# Patient Record
Sex: FEMALE | Race: WHITE | NOT HISPANIC OR LATINO | Employment: FULL TIME | URBAN - METROPOLITAN AREA
[De-identification: names, ages, dates, MRNs, and addresses within clinical notes are randomized per-mention and may not be internally consistent; named-entity substitution may affect disease eponyms.]

---

## 2023-09-26 DIAGNOSIS — Z00.00 HEALTHCARE MAINTENANCE: Primary | ICD-10-CM

## 2024-03-20 ENCOUNTER — HOSPITAL ENCOUNTER (OUTPATIENT)
Dept: RADIOLOGY | Facility: HOSPITAL | Age: 54
Discharge: HOME | End: 2024-03-20
Payer: COMMERCIAL

## 2024-03-20 DIAGNOSIS — Z12.39 ENCOUNTER FOR OTHER SCREENING FOR MALIGNANT NEOPLASM OF BREAST: ICD-10-CM

## 2024-03-20 PROCEDURE — 6100000003 BI MR BREAST BILATERAL WITH CONTRAST FAST SCREENING SELF PAY

## 2024-03-20 RX ORDER — GADOTERATE MEGLUMINE 376.9 MG/ML
12 INJECTION INTRAVENOUS
Status: COMPLETED | OUTPATIENT
Start: 2024-03-20 | End: 2024-03-20

## 2024-03-20 RX ADMIN — GADOTERATE MEGLUMINE 12 ML: 376.9 INJECTION INTRAVENOUS at 09:34

## 2024-03-25 ENCOUNTER — TELEPHONE (OUTPATIENT)
Dept: SURGICAL ONCOLOGY | Facility: CLINIC | Age: 54
End: 2024-03-25
Payer: COMMERCIAL

## 2024-10-01 ENCOUNTER — APPOINTMENT (OUTPATIENT)
Dept: SURGICAL ONCOLOGY | Facility: CLINIC | Age: 54
End: 2024-10-01
Payer: COMMERCIAL

## 2024-10-01 ENCOUNTER — HOSPITAL ENCOUNTER (OUTPATIENT)
Dept: RADIOLOGY | Facility: CLINIC | Age: 54
Discharge: HOME | End: 2024-10-01
Payer: COMMERCIAL

## 2024-10-01 VITALS — HEIGHT: 63 IN | BODY MASS INDEX: 23.04 KG/M2 | WEIGHT: 130 LBS

## 2024-10-01 DIAGNOSIS — Z12.31 SCREENING MAMMOGRAM FOR BREAST CANCER: ICD-10-CM

## 2024-10-01 PROCEDURE — 77067 SCR MAMMO BI INCL CAD: CPT

## 2024-11-26 DIAGNOSIS — Z91.89 AT HIGH RISK FOR BREAST CANCER: ICD-10-CM

## 2025-03-19 ENCOUNTER — HOSPITAL ENCOUNTER (OUTPATIENT)
Dept: RADIOLOGY | Facility: HOSPITAL | Age: 55
Discharge: HOME | End: 2025-03-19

## 2025-03-19 VITALS — WEIGHT: 130 LBS | BODY MASS INDEX: 23.03 KG/M2

## 2025-03-19 DIAGNOSIS — Z91.89 AT HIGH RISK FOR BREAST CANCER: ICD-10-CM

## 2025-03-19 PROCEDURE — 2550000001 HC RX 255 CONTRASTS

## 2025-03-19 PROCEDURE — A9575 INJ GADOTERATE MEGLUMI 0.1ML: HCPCS

## 2025-03-19 PROCEDURE — 6100000003 BI MR BREAST BILATERAL WITH CONTRAST FAST SCREENING SELF PAY

## 2025-03-19 RX ORDER — GADOTERATE MEGLUMINE 376.9 MG/ML
12 INJECTION INTRAVENOUS
Status: COMPLETED | OUTPATIENT
Start: 2025-03-19 | End: 2025-03-19

## 2025-03-19 RX ORDER — GADOTERATE MEGLUMINE 376.9 MG/ML
12 INJECTION INTRAVENOUS
Status: CANCELLED | OUTPATIENT
Start: 2025-03-19

## 2025-03-19 RX ADMIN — GADOTERATE MEGLUMINE 12 ML: 376.9 INJECTION INTRAVENOUS at 09:16

## 2025-03-20 NOTE — PROGRESS NOTES
Chief Complaint  High risk breast cancer surveillance    History Of Present Illness  Julieth Junior is a very pleasant 55 y.o. female presenting for a high risk breast cancer surveillance.  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  Dr Ambar Silva performed right breast excision for biopsy noting fibroepithelial lesion and excision showed fibroadenoma with usual ductal hyperplasia, no atypia. She has history of additional benign breast biopsies.   She has family history of breast cancer in mother, maternal aunt, and maternal great aunt. Today, she denies new breast masses or nodules, skin or nipple changes, nipple discharge, or lymphadenopathy bilaterally. She declined tamoxifen.      BREAST IMAGING: 3/19/2025 FAST breast MRI BI-RADS Category 1.   10/1/2024 Bilateral screening mammogram, BI-RADS Category 2.       REPRODUCTIVE HISTORY: menarche age 11, , first birth age 33, no OCPs, suspected menopausal, amenorrhea due to uterine ablation, >1 breast biopsy, scattered  breast tissue           FAMILY CANCER HISTORY:   Mother: breast cancer age 52, uterine cancer in her 70s  Maternal aunt: breast cancer 78  maternal great aunt: breast cancer in her 50s  Father: primary hepatic cancer  Paternal grandfather: esophageal cancer     Review of Systems  Constitutional:  Negative for appetite change, fatigue, fever and unexpected weight change.   HENT:  Negative for ear pain, hearing loss, nosebleeds, sore throat and trouble swallowing.    Eyes:  Negative for discharge, itching and visual disturbance.   Breast: As stated in HPI.  Respiratory:  Negative for cough, chest tightness and shortness of breath.    Cardiovascular:  Negative for chest pain, palpitations and leg swelling.   Gastrointestinal:  Negative for abdominal pain, constipation, diarrhea and nausea.   Endocrine: Negative for cold intolerance and heat intolerance.   Genitourinary:  Negative for dysuria,  frequency, hematuria, pelvic pain and vaginal bleeding.   Musculoskeletal:  Negative for arthralgias, back pain, gait problem, joint swelling and myalgias.   Skin:  Negative for color change and rash.   Allergic/Immunologic: Negative for environmental allergies and food allergies.   Neurological:  Negative for dizziness, tremors, speech difficulty, weakness, numbness and headaches.   Hematological:  Does not bruise/bleed easily.   Psychiatric/Behavioral:  Negative for agitation, dysphoric mood and sleep disturbance. The patient is not nervous/anxious.       Surgical History  She has a past surgical history that includes Knee surgery (09/19/2016); Other surgical history (09/19/2016); Dilation and curettage of uterus (09/19/2016); Other surgical history (09/19/2016); Breast biopsy (10/20/2016); Neck surgery (03/06/2018); Breast biopsy (03/06/2018); and Other surgical history (09/13/2022).     Social History  She has no history on file for tobacco use, alcohol use, and drug use.    Family History  Family History   Problem Relation Name Age of Onset    Breast cancer Mother      Breast cancer Mother's Sister          Allergies  Patient has no known allergies.    Past Medical History  She has a past medical history of Benign neoplasm of right breast (10/20/2016), Neoplasm of uncertain behavior of unspecified breast (10/20/2016), Other abnormal and inconclusive findings on diagnostic imaging of breast (09/21/2016), Personal history of antineoplastic chemotherapy, Personal history of Hodgkin lymphoma (10/20/2016), Personal history of irradiation, Personal history of other specified conditions (09/21/2016), and Personal history of other specified conditions (08/04/2020).     Physical Exam  Patient is alert and oriented x3 and in a relaxed and appropriate mood. Her gait is steady and hand grasps are equal. Sclera is clear. The breasts are nearly symmetrical. Right breast far central lateral breast well healed excisional  biopsy scar. Left breast   far central lateral breast well healed excisional biopsy scar. The tissue is soft without palpable abnormalities, discrete nodules or masses. The skin and nipples appear normal. There is no cervical, supraclavicular or axillary lymphadenopathy. Heart rate and rhythm normal, S1 and S2 appreciated. The lungs are clear to auscultation bilaterally. Abdomen is soft and non-tender.      Physical Exam  Chest:            Last Recorded Vitals  Blood pressure 144/81, pulse 81, temperature 36.3 °C (97.3 °F), temperature source Temporal, weight 60.9 kg (134 lb 3.2 oz), SpO2 100%.    Relevant Results and Imaging  BI mammo bilateral screening tomosynthesis 10/01/2024    Narrative  Interpreted By:  Sahil Gatica,  STUDY:  BI MAMMO BILATERAL SCREENING TOMOSYNTHESIS;  10/1/2024 9:23 am    ACCESSION NUMBER(S):  [id number]    ORDERING CLINICIAN:  SELF MAMMOGRAM    INDICATION:  Screening. History of bilateral core needle and excisional biopsies.  History of Hodgkin's lymphoma treated with chemotherapy and  radiation. Family history of breast cancer    ,Z12.31 Encounter for screening mammogram for malignant neoplasm of  breast    COMPARISON:  Breast MRI 03/20/2024, screening mammogram 09/26/2023 and all  relevant prior breast imaging exams available at the time of  dictation.    FINDINGS:  2D and tomosynthesis images were reviewed at 1 mm slice thickness.    Density:  There are scattered areas of fibroglandular density.    There are bilateral biopsy markers and stable benign postsurgical  changes. No suspicious masses or calcifications are identified.    Impression  No mammographic evidence of malignancy.    BI-RADS CATEGORY:  BI-RADS Category:  2 Benign.  Recommendation:  Annual Screening.  Recommended Date:  1 Year.  Laterality:  Bilateral.        Provider Impressions  Normal clinical breast exam and imaging, history benign excisional breast biopsies, family history breast cancer.     Risk  Profile      Patient Discussion/Summary  Your clinical examination is normal. Please return in October 2025 for mammogram and office visit or sooner if you have any problems or concerns.     High risk breast surveillance care plan:  Yearly mammogram with digital breast tomosynthesis  Twice yearly clinical breast examinations  Breast MRI-due April 2026  Monthly self breast examinations &/or regular self breast awareness  Vitamin D3 2000 IU/daily (over the counter) unless your PCP recommends you take a specific dose  Exercise 3-4 times per week for 45-60 minutes  Limit alcohol to 3-4 drinks per week if you are menopausal  Eat healthy low-fat diet with lots of vegetable & fruits    Risk model indicates you are eligible for endocrine therapy with Tamoxifen, Raloxifene or Aromatase inhibitor. Endocrine therapy reduces lifetime risk of breast cancer by up to 50% when taken for 5 years. There is an alternative low dose Tamoxifen which can be taken for only 3 years.      IMPORTANT INFORMATION REGARDING YOUR RESULTS    If you receive medical information from My [facility] Personal Health Record (online chart) your results will be released into your chart. This means you may view or see results of your biopsy or procedure before I contact you directly. If this occurs, please call the office and we will discuss your results over the phone.     You can see your health information, review clinical summaries from office visits & test results online when you follow your health with MY  Chart, a personal health record. To sign up go to [email or web address]. If you need assistance with signing up or trouble getting into your account call [employer] Patient Line 24/7 at [phone].    My office phone number is [phone] if you need to get in touch with me or have additional questions or concerns. Thank you for choosing [facility] and trusting me as your healthcare provider. I look forward to seeing you again at your  next office visit. I am honored to be a provider on your health care team and I remain dedicated to helping you achieve your health goals.    Abby Denise, APRN-CNP

## 2025-03-21 ENCOUNTER — TELEPHONE (OUTPATIENT)
Dept: SURGICAL ONCOLOGY | Facility: HOSPITAL | Age: 55
End: 2025-03-21
Payer: COMMERCIAL

## 2025-03-21 NOTE — TELEPHONE ENCOUNTER
Result Communication    Resulted Orders   MR breast bilateral w IV contrast fast screening self pay    Narrative    Interpreted By:  Sandra Samuel,  and Tisha Glass   STUDY:  BI MR BREAST BILATERAL WITH CONTRAST FAST SCREENING SELF PAY;  3/19/2025 9:12 am      ACCESSION NUMBER(S):  [id number]      ORDERING CLINICIAN:  BARRY GLOVER      INDICATION:  Dense breast tissue on mammography. Patient has a family history of  breast cancer.      ,Z91.89 Other specified personal risk factors, not elsewhere  classified      COMPARISON:  MRI breast 03/20/2024. Mammogram 10/01/2024 and 09/26/2023.      TECHNIQUE:  Using a dedicated breast coil, STIR axial and T1-weighted fat  saturation axial images of the breasts were obtained, the latter both  before and after intravenous administration of Gadolinium DTPA.      Intravenous contrast: 12 ML of Dotarem      FINDINGS:  Density: Scattered fibroglandular tissue.      There is symmetric minimal bilateral background enhancement.      RIGHT BREAST:  No suspicious mass or nonmass enhancement is  identified.      No axillary or internal mammary lymphadenopathy is appreciated.      LEFT BREAST:  No suspicious mass or nonmass enhancement is identified.      No axillary or internal mammary lymphadenopathy is appreciated.      NON-BREAST FINDINGS:  None.        Impression    No MRI evidence of malignancy in either breast.      BI-RADS CATEGORY:      BI-RADS Category:  1 Negative.  Recommendation:  Annual Screening.  Recommended Date:  1 Year.  Laterality:  Bilateral.      For any future breast imaging appointments, please call [phone] ([phone]).          MACRO:  None      Signed by: Sandra Samuel 3/20/2025 3:32 PM  Dictation workstation:   [id number]       3:58 PM      Results were successfully communicated with the patient and they acknowledged their understanding.

## 2025-03-26 ENCOUNTER — OFFICE VISIT (OUTPATIENT)
Dept: SURGICAL ONCOLOGY | Facility: CLINIC | Age: 55
End: 2025-03-26
Payer: COMMERCIAL

## 2025-03-26 VITALS
OXYGEN SATURATION: 100 % | BODY MASS INDEX: 23.77 KG/M2 | TEMPERATURE: 97.3 F | SYSTOLIC BLOOD PRESSURE: 144 MMHG | DIASTOLIC BLOOD PRESSURE: 81 MMHG | WEIGHT: 134.2 LBS | HEART RATE: 81 BPM

## 2025-03-26 DIAGNOSIS — Z12.31 SCREENING MAMMOGRAM FOR BREAST CANCER: ICD-10-CM

## 2025-03-26 DIAGNOSIS — Z12.39 BREAST CANCER SCREENING, HIGH RISK PATIENT: Primary | ICD-10-CM

## 2025-03-26 DIAGNOSIS — Z80.3 FAMILY HISTORY OF BREAST CANCER: ICD-10-CM

## 2025-03-26 PROCEDURE — 99214 OFFICE O/P EST MOD 30 MIN: CPT

## 2025-03-26 ASSESSMENT — PAIN SCALES - GENERAL: PAINLEVEL_OUTOF10: 0-NO PAIN

## 2025-03-26 NOTE — PATIENT INSTRUCTIONS
Your clinical examination is normal. Please return in October 2025 for mammogram and office visit or sooner if you have any problems or concerns.     High risk breast surveillance care plan:  Yearly mammogram with digital breast tomosynthesis  Twice yearly clinical breast examinations  Breast MRI-due April 2026  Monthly self breast examinations &/or regular self breast awareness  Vitamin D3 2000 IU/daily (over the counter) unless your PCP recommends you take a specific dose  Exercise 3-4 times per week for 45-60 minutes  Limit alcohol to 3-4 drinks per week if you are menopausal  Eat healthy low-fat diet with lots of vegetable & fruits    Risk model indicates you are eligible for endocrine therapy with Tamoxifen, Raloxifene or Aromatase inhibitor. Endocrine therapy reduces lifetime risk of breast cancer by up to 50% when taken for 5 years. There is an alternative low dose Tamoxifen which can be taken for only 3 years.      IMPORTANT INFORMATION REGARDING YOUR RESULTS    If you receive medical information from My [facility] Personal Health Record (online chart) your results will be released into your chart. This means you may view or see results of your biopsy or procedure before I contact you directly. If this occurs, please call the office and we will discuss your results over the phone.     You can see your health information, review clinical summaries from office visits & test results online when you follow your health with MY  Chart, a personal health record. To sign up go to [email or web address]. If you need assistance with signing up or trouble getting into your account call [employer] Patient Line 24/7 at [phone].    My office phone number is [phone] if you need to get in touch with me or have additional questions or concerns. Thank you for choosing [facility] and trusting me as your healthcare provider. I look forward to seeing you again at your next office visit. I am honored to be a  provider on your health care team and I remain dedicated to helping you achieve your health goals.